# Patient Record
Sex: MALE | Race: BLACK OR AFRICAN AMERICAN | NOT HISPANIC OR LATINO | Employment: OTHER | URBAN - METROPOLITAN AREA
[De-identification: names, ages, dates, MRNs, and addresses within clinical notes are randomized per-mention and may not be internally consistent; named-entity substitution may affect disease eponyms.]

---

## 2017-10-09 ENCOUNTER — OFFICE VISIT (OUTPATIENT)
Dept: FAMILY MEDICINE | Facility: CLINIC | Age: 22
End: 2017-10-09
Payer: COMMERCIAL

## 2017-10-09 VITALS
HEIGHT: 69 IN | WEIGHT: 184.75 LBS | DIASTOLIC BLOOD PRESSURE: 69 MMHG | OXYGEN SATURATION: 97 % | SYSTOLIC BLOOD PRESSURE: 120 MMHG | HEART RATE: 65 BPM | TEMPERATURE: 99 F | BODY MASS INDEX: 27.36 KG/M2

## 2017-10-09 DIAGNOSIS — S83.421A SPRAIN OF LATERAL COLLATERAL LIGAMENT OF RIGHT KNEE, INITIAL ENCOUNTER: Primary | ICD-10-CM

## 2017-10-09 DIAGNOSIS — S39.012A STRAIN OF LUMBAR REGION, INITIAL ENCOUNTER: ICD-10-CM

## 2017-10-09 PROCEDURE — 99213 OFFICE O/P EST LOW 20 MIN: CPT | Mod: S$GLB,,, | Performed by: INTERNAL MEDICINE

## 2017-10-09 PROCEDURE — 99999 PR PBB SHADOW E&M-EST. PATIENT-LVL III: CPT | Mod: PBBFAC,,, | Performed by: INTERNAL MEDICINE

## 2017-10-09 NOTE — PROGRESS NOTES
"This note was created by combination of typed  and Dragon dictation.  Transcription errors may be present.  If there are any questions, please contact me.    Assessment & Plan  Sprain of lateral collateral ligament of right knee, initial encounter  Strain of lumbar region, initial encounter  -expect complete resolution, no indication for imaging today, rest, cool packs, home PT handout provided.    Medications Discontinued During This Encounter   Medication Reason    methocarbamol (ROBAXIN) 500 MG Tab Patient no longer taking       Follow-up: No Follow-up on file.      =================================================================      Chief Complaint   Patient presents with    Low-back Pain     week    Knee Pain     Right/1 1/2 weeks       HPI  Edwar is a 21 y.o. male, last appointment with this clinic was Visit date not found.    Right knee pain.  Similar to left knee pain, pain with marching and running.  Was told he had "runner's knee".  Similar to it know.  Feels like it's on the lateral right knee no swelling.  Is currently [employer].  No inciting event.  Using a knee brace.  Maintaining activity.  Nothing OTC for this.  Doing lunges.    Playing flag football weekly.  Going out and tripped up and landed on his back. Pain in the middle of the lower back.  About a week ago.  Pain with leaning forward or standing too long it hurts.  No pain with BM/urination.      Patient Care Team:  Ricarda Callahan MD as PCP - General (Pediatrics)    There are no active problems to display for this patient.      PAST MEDICAL HISTORY:  History reviewed. No pertinent past medical history.    PAST SURGICAL HISTORY:  History reviewed. No pertinent surgical history.    SOCIAL HISTORY:  Social History     Social History    Marital status: Single     Spouse name: N/A    Number of children: N/A    Years of education: N/A     Occupational History    Not on file.     Social History Main Topics    Smoking status: " "Never Smoker    Smokeless tobacco: Never Used    Alcohol use No    Drug use: No    Sexual activity: No     Other Topics Concern    Not on file     Social History Narrative    No narrative on file       ALLERGIES AND MEDICATIONS: updated and reviewed.  Review of patient's allergies indicates:  No Known Allergies  No current outpatient prescriptions on file.     No current facility-administered medications for this visit.        Review of Systems   Constitutional: Negative for chills and fever.   Gastrointestinal: Negative for abdominal pain.   Genitourinary: Negative for dysuria.       Physical Exam   Vitals:    10/09/17 1541   BP: 120/69   BP Location: Right arm   Patient Position: Sitting   BP Method: Large (Manual)   Pulse: 65   Temp: 98.5 °F (36.9 °C)   TempSrc: Oral   SpO2: 97%   Weight: 83.8 kg (184 lb 11.9 oz)   Height: 5' 8.5" (1.74 m)    Body mass index is 27.68 kg/m².  Weight: 83.8 kg (184 lb 11.9 oz)   Height: 5' 8.5" (174 cm)     Physical Exam   Constitutional: He appears well-developed and well-nourished. No distress.   HENT:   Head: Normocephalic and atraumatic.   Eyes: No scleral icterus.   Musculoskeletal:   L spine mild TTP lower lumbar spine midline without deformity, instability, asymmetry. Straight leg raise without pain bilaterally  R knee, no effusion, full ROM, some mild TTP lateral joint space, Nikko some discomfort on the lateral knee; normal flexion though some pain with most extreme flexion.   Skin: No rash noted.   Psychiatric: He has a normal mood and affect. His behavior is normal. Thought content normal.     "

## 2017-10-09 NOTE — LETTER
October 9, 2017      [facility]  [address] [facility]  [place] [address]  Phone: [phone]  Fax: [phone]       Patient: Edwar Hung   YOB: 1995  Date of Visit: 10/09/2017    To Whom It May Concern:    Messi Hung  was at [facility] on 10/09/2017. He is recovering from acute injury and I have recommended that he not participate in PT testing this weekend.  I anticipate that he will be able to perform in 2 weeks. If you have any questions or concerns, or if I can be of further assistance, please do not hesitate to contact me.    Sincerely,      Roberto Groves MD

## 2017-10-30 ENCOUNTER — HOSPITAL ENCOUNTER (OUTPATIENT)
Dept: RADIOLOGY | Facility: HOSPITAL | Age: 22
Discharge: HOME | End: 2017-10-30
Attending: FAMILY MEDICINE
Payer: COMMERCIAL

## 2017-10-30 ENCOUNTER — OFFICE VISIT (OUTPATIENT)
Dept: FAMILY MEDICINE | Facility: CLINIC | Age: 22
End: 2017-10-30
Payer: COMMERCIAL

## 2017-10-30 VITALS
HEIGHT: 68 IN | OXYGEN SATURATION: 97 % | TEMPERATURE: 99 F | SYSTOLIC BLOOD PRESSURE: 130 MMHG | HEART RATE: 68 BPM | WEIGHT: 189.5 LBS | BODY MASS INDEX: 28.72 KG/M2 | DIASTOLIC BLOOD PRESSURE: 82 MMHG

## 2017-10-30 DIAGNOSIS — M54.50 ACUTE MIDLINE LOW BACK PAIN WITHOUT SCIATICA: ICD-10-CM

## 2017-10-30 DIAGNOSIS — M54.50 ACUTE MIDLINE LOW BACK PAIN WITHOUT SCIATICA: Primary | ICD-10-CM

## 2017-10-30 PROCEDURE — 99214 OFFICE O/P EST MOD 30 MIN: CPT | Mod: S$GLB,,, | Performed by: FAMILY MEDICINE

## 2017-10-30 PROCEDURE — 72100 X-RAY EXAM L-S SPINE 2/3 VWS: CPT | Mod: 26,,, | Performed by: RADIOLOGY

## 2017-10-30 PROCEDURE — 99999 PR PBB SHADOW E&M-EST. PATIENT-LVL III: CPT | Mod: PBBFAC,,, | Performed by: FAMILY MEDICINE

## 2017-10-30 PROCEDURE — 72100 X-RAY EXAM L-S SPINE 2/3 VWS: CPT | Mod: TC,PO

## 2017-10-30 RX ORDER — NAPROXEN 500 MG/1
500 TABLET ORAL 2 TIMES DAILY WITH MEALS
Qty: 30 TABLET | Refills: 0 | Status: SHIPPED | OUTPATIENT
Start: 2017-10-30 | End: 2018-05-17

## 2017-10-30 RX ORDER — CYCLOBENZAPRINE HCL 10 MG
10 TABLET ORAL NIGHTLY
Qty: 21 TABLET | Refills: 0 | Status: SHIPPED | OUTPATIENT
Start: 2017-10-30 | End: 2018-05-17

## 2017-10-30 NOTE — LETTER
October 30, 2017      [facility]  [address] [facility]  [place] [address]  Phone: [phone]  Fax: [phone]       Patient: Edwar Hung   YOB: 1995  Date of Visit: 10/30/2017    To Whom It May Concern:    Messi Hung  was at [facility] on 10/30/2017. He may return to work/school on 10/31/2017 with no restrictions. If you have any questions or concerns, or if I can be of further assistance, please do not hesitate to contact me.    Sincerely,    Esthela Julian MA

## 2017-10-30 NOTE — PROGRESS NOTES
Office Visit    Patient Name: Edwar Hung Jr.    : 1995  MRN: [id number]      Assessment/Plan:  Edwar Hung Jr. is a 22 y.o. male who presents today for :    Acute midline low back pain without sciatica  -     X-Ray Lumbar Spine Ap And Lateral; Future; Expected date: 10/30/2017  -     naproxen (NAPROSYN) 500 MG tablet; Take 1 tablet (500 mg total) by mouth 2 (two) times daily with meals.  Dispense: 30 tablet; Refill: 0  -     cyclobenzaprine (FLEXERIL) 10 MG tablet; Take 1 tablet (10 mg total) by mouth every evening.  Dispense: 21 tablet; Refill: 0    -obtain XR to r/o fracture-reassurance provided - advised pt that pain may last up to 4-6 weeks, but in the meantime, advised to add heat/massage and avoid provocative movements that could worsen pain, maintain good posture, as well as using proper lifting techniques.  -initiate Naproxen and Flexeril, advised adding Tylenol in between doses of NSAIDs as needed for pain. Side effects and precautions given.  -counseled patient extensively on stretching/strengthening exercises, maintaining good posture as demonstrated in clinic (handout also given) to help with decreasing risk for future injury.  -RTC in 4-6 weeks, or sooner if Sx worsens or call clinic back if pt has any concerns.         Return in about 4 weeks (around 2017).     This note was created by combination of typed  and Dragon dictation.  Transcription errors may be present.  If there are any questions, please contact me.                  ----------------------------------------------------------------------------------------------------------------------      HPI:  Edwar is a 22 y.o. male who presents today for:    Back Pain        This patient has multiple medical diagnoses as noted below.    This patient is new to me     BACK PAIN    here for back pain for 4 weeks   Patient was playing flag football and went up for an interception and got tripped and landed flat on his  "back.   Pt was able to get up and continue to play with mild pain. He woke up the next day with pain 8/10 - he took some tylenol and used a back brace and the pain got better.   Since then, the pain has been about 5/10 and he continues to take Tylenol.  Worse with bending over, slouching, standing for long periods of time     Pt is still able to ambulate comfortably and perform daily routine    No wt loss/fatigue/malaise/BMs changes or urinary changes/pain radiation to LEs/tingling/numbness/weakness.          Additional ROS  No vision changes  No F/C/wt changes/fatigue  No dysphagia/sore throat/rhinorrhea  No CP/SOB/palpitations/swelling  No cough/wheezing/SOB  No nausea/vomiting/abd pain/blood in stool, no diarrhea, no constipation  No muscle aches/joint pain  No rashes  No weakness/HA/tingling/numbness      There is no problem list on file for this patient.      History reviewed. No pertinent surgical history.    History reviewed. No pertinent family history.    Social History     Social History    Marital status: Single     Spouse name: N/A    Number of children: N/A    Years of education: N/A     Occupational History    Not on file.     Social History Main Topics    Smoking status: Never Smoker    Smokeless tobacco: Never Used    Alcohol use No    Drug use: No    Sexual activity: No     Other Topics Concern    Not on file     Social History Narrative    No narrative on file       Current Medications  Medications reviewed and updated.     Allergies   Review of patient's allergies indicates:  No Known Allergies          Review of Systems  See HPI      Physical Exam  /82 (BP Location: Right arm, Patient Position: Sitting, BP Method: Large (Manual))   Pulse 68   Temp 98.6 °F (37 °C) (Oral)   Ht 5' 8" (1.727 m)   Wt 86 kg (189 lb 7.8 oz)   SpO2 97%   BMI 28.81 kg/m²     GEN: NAD, well developed, pleasant, well nourished  SKIN: normal turgor, no rashes  PSYCH: AOx3, appropriate mood and " affect  MSK: warm/well perfused, normal ROM in all 4 extremities, no c/c/e.  BACK: normal alignment of spine. FROM of back.   +TTP over the L4 area over spine and b/l paraspinal mm.   +pain with forward flexion and backward extension. No pain with lateral bending. NEG straight leg raise.

## 2018-05-17 ENCOUNTER — HOSPITAL ENCOUNTER (OUTPATIENT)
Dept: RADIOLOGY | Facility: HOSPITAL | Age: 23
Discharge: HOME | End: 2018-05-17
Attending: FAMILY MEDICINE
Payer: COMMERCIAL

## 2018-05-17 ENCOUNTER — OFFICE VISIT (OUTPATIENT)
Dept: FAMILY MEDICINE | Facility: CLINIC | Age: 23
End: 2018-05-17
Payer: COMMERCIAL

## 2018-05-17 VITALS
OXYGEN SATURATION: 98 % | HEART RATE: 89 BPM | BODY MASS INDEX: 27.17 KG/M2 | TEMPERATURE: 98 F | WEIGHT: 189.81 LBS | DIASTOLIC BLOOD PRESSURE: 76 MMHG | HEIGHT: 70 IN | SYSTOLIC BLOOD PRESSURE: 118 MMHG

## 2018-05-17 DIAGNOSIS — M79.645 PAIN OF LEFT THUMB: ICD-10-CM

## 2018-05-17 DIAGNOSIS — M79.644 PAIN OF RIGHT MIDDLE FINGER: ICD-10-CM

## 2018-05-17 DIAGNOSIS — M79.645 PAIN OF LEFT THUMB: Primary | ICD-10-CM

## 2018-05-17 PROCEDURE — 73130 X-RAY EXAM OF HAND: CPT | Mod: 26,LT,, | Performed by: STUDENT IN AN ORGANIZED HEALTH CARE EDUCATION/TRAINING PROGRAM

## 2018-05-17 PROCEDURE — 99214 OFFICE O/P EST MOD 30 MIN: CPT | Mod: S$GLB,,, | Performed by: FAMILY MEDICINE

## 2018-05-17 PROCEDURE — 99999 PR PBB SHADOW E&M-EST. PATIENT-LVL III: CPT | Mod: PBBFAC,,, | Performed by: FAMILY MEDICINE

## 2018-05-17 PROCEDURE — 3008F BODY MASS INDEX DOCD: CPT | Mod: CPTII,S$GLB,, | Performed by: FAMILY MEDICINE

## 2018-05-17 PROCEDURE — 73130 X-RAY EXAM OF HAND: CPT | Mod: 26,RT,, | Performed by: STUDENT IN AN ORGANIZED HEALTH CARE EDUCATION/TRAINING PROGRAM

## 2018-05-17 PROCEDURE — 73130 X-RAY EXAM OF HAND: CPT | Mod: 50,TC,FY,PO

## 2018-05-17 NOTE — PROGRESS NOTES
Office Visit    Patient Name: Edwar Hung Jr.    : 1995  MRN: [id number]      Assessment/Plan:  Edwar Hung Jr. is a 22 y.o. male who presents today for :    Pain of left thumb  -     X-Ray Hand Complete Left; Future; Expected date: 2018    Pain of right middle finger  -     X-Ray Hand Complete Right; Future; Expected date: 2018    -XR to r/o fracture due to persistent pain  -activity modification d/w pt  -advised icing and elevation, and daily stretching finger exercises to increased mobility and flexibility. Advised pt that it may take up to 4-6 weeks to see significant improvement  -Tylenol/Ibuprofen as needed for pain.    -f/u in 1 week        This note was created by combination of typed  and Dragon dictation.  Transcription errors may be present.  If there are any questions, please contact me.        ----------------------------------------------------------------------------------------------------------------------      HPI:  Edwar is a 22 y.o. male who presents today for:    Hand Pain      This patient is new to me     This patient has multiple medical diagnoses as noted below.      Patient is here today for  Hand Pain  Left thumb pain that started yesterday, associated with swelling, after he jammed his finger while playing flag football. Pt states that he is unable to bend his finger due to the pain and swelling. He also jammed his middle R finger 1 week ago in a similar manner, still has moderate pain with bending and is very stiff, unable to complete bend finger toward palm.  Pain is mostly tolerable at rest.   Pt is still able to perform daily routine. Has not taken any meds OTC.  No tingling/numbness/weakness.  Denies pain radiation        Additional ROS    No CP/SOB/palpitations/swelling  No cough/wheezing/SOB  No nausea/vomiting/abd pain  No rash, no history of allergies to any specific substances    There is no problem list on file for this  "patient.      Current Medications  Medications reviewed/updated.     Current Outpatient Prescriptions on File Prior to Visit   Medication Sig Dispense Refill    [DISCONTINUED] cyclobenzaprine (FLEXERIL) 10 MG tablet Take 1 tablet (10 mg total) by mouth every evening. 21 tablet 0    [DISCONTINUED] naproxen (NAPROSYN) 500 MG tablet Take 1 tablet (500 mg total) by mouth 2 (two) times daily with meals. 30 tablet 0     No current facility-administered medications on file prior to visit.        History reviewed. No pertinent surgical history.    History reviewed. No pertinent family history.    Social History     Social History    Marital status: Single     Spouse name: N/A    Number of children: N/A    Years of education: N/A     Occupational History    Not on file.     Social History Main Topics    Smoking status: Never Smoker    Smokeless tobacco: Never Used    Alcohol use No    Drug use: No    Sexual activity: No     Other Topics Concern    Not on file     Social History Narrative    No narrative on file           Allergies   Review of patient's allergies indicates:  No Known Allergies          Review of Systems  See HPI      Physical Exam  /76 (BP Location: Left arm, Patient Position: Sitting, BP Method: Medium (Manual))   Pulse 89   Temp 98.4 °F (36.9 °C) (Oral)   Ht 5' 10" (1.778 m)   Wt 86.1 kg (189 lb 13.1 oz)   SpO2 98%   BMI 27.24 kg/m²     GEN: NAD, well developed, pleasant, well nourished  LUNGS: CTAB, no w/r/r, no increased work of breathing   HEART: RRR, normal S1 and S2, no m/r/g, no edema  SKIN: normal turgor, no rashes  MSK: warm/well perfused, normal ROM in all 4 extremities, no c/c/e.  L thumb with moderate swelling, no redness, with decreased ROM due to pain. R middle finger with mild swelling, no redness, able to flex only retirement towards palm. Normal sensation otherwise.              "

## 2018-05-19 ENCOUNTER — PATIENT MESSAGE (OUTPATIENT)
Dept: FAMILY MEDICINE | Facility: CLINIC | Age: 23
End: 2018-05-19

## 2018-05-21 ENCOUNTER — TELEPHONE (OUTPATIENT)
Dept: FAMILY MEDICINE | Facility: CLINIC | Age: 23
End: 2018-05-21

## 2018-05-21 DIAGNOSIS — S61.209A AVULSION OF FINGER, INITIAL ENCOUNTER: Primary | ICD-10-CM

## 2018-05-21 NOTE — TELEPHONE ENCOUNTER
Please notify pt that I have already made a referral to Dr. Calin Rodriguez - hand specialist for his finger avulsion.    1) Please [facility] to set up an appt for patient and let him know the time he's available.    2) Ask patient to RTC for annual physical.    Bilateral Hand XR 5/17/18  FINDINGS:  Osseous structures are well mineralized.    On the PA view of the right hand, there are 2 small ossific fragments on the ulnar side of the PIP joint of the 3rd finger possibly representing avulsion fragments at the insertion of the lateral collateral ligament.  There is a marker indicating pain of the 3rd digit.  There is subtle swelling about the PIP joint of the 3rd digit.    No additional fracture is suspected.  No dislocation or aggressive bony lesion.      Impression       Suspected avulsion at the insertion of the lateral collateral ligament on the head of the proximal phalange of the 3rd digit.  This correlates with the marked area of pain and some mild soft tissue swelling about the PIP joint of the 3rd digit.    No definite osseous injury at the site of marked pain of the thumb of the left hand.    This report was flagged in Epic as abnormal.

## 2018-06-12 ENCOUNTER — OFFICE VISIT (OUTPATIENT)
Dept: FAMILY MEDICINE | Facility: CLINIC | Age: 23
End: 2018-06-12
Payer: COMMERCIAL

## 2018-06-12 VITALS
HEART RATE: 67 BPM | HEIGHT: 70 IN | WEIGHT: 193.44 LBS | OXYGEN SATURATION: 96 % | SYSTOLIC BLOOD PRESSURE: 116 MMHG | TEMPERATURE: 98 F | DIASTOLIC BLOOD PRESSURE: 72 MMHG | BODY MASS INDEX: 27.69 KG/M2

## 2018-06-12 DIAGNOSIS — E66.3 OVERWEIGHT (BMI 25.0-29.9): ICD-10-CM

## 2018-06-12 DIAGNOSIS — Z00.00 ANNUAL PHYSICAL EXAM: Primary | ICD-10-CM

## 2018-06-12 DIAGNOSIS — T14.8XXA AVULSION FRACTURE: ICD-10-CM

## 2018-06-12 PROCEDURE — 99395 PREV VISIT EST AGE 18-39: CPT | Mod: S$GLB,,, | Performed by: FAMILY MEDICINE

## 2018-06-12 PROCEDURE — 99999 PR PBB SHADOW E&M-EST. PATIENT-LVL III: CPT | Mod: PBBFAC,,, | Performed by: FAMILY MEDICINE

## 2018-06-12 NOTE — PROGRESS NOTES
Office Visit    Patient Name: Edwar Hung Jr.    : 1995  MRN: [id number]      Assessment/Plan:  Edwar Hung Jr. is a 22 y.o. male who presents today for :    Annual physical exam  -     Hemoglobin A1c; Future; Expected date: 2018  -     CBC Without Differential; Future; Expected date: 2018  -     Comprehensive metabolic panel; Future; Expected date: 2018  -     Lipid panel; Future; Expected date: 2018  Overweight (BMI 25.0-29.9)    -anticipatory guidance provided with age appropriate preventative services discussed  -advised regular physical exercises/healthy nutrition and food choices.   -d/w pt about the importance of portion control  -advised moderate consumption of alcohol and benefits of avoiding tobacco use/substance abuse.    Avulsion fracture    -continue with PT  -f/u with Ortho as needed      Follow-up for any evaluation as needed.     This note was created by combination of typed  and Dragon dictation.  Transcription errors may be present.  If there are any questions, please contact me.        ----------------------------------------------------------------------------------------------------------------------      HPI:  Edwar is a 22 y.o. male who presents today for:          This patient has multiple medical diagnoses as noted below.    Patient is here today for Annual Exam  .    Patient is doing well and has no major complaints.  He was last seen for avulsion fracture in the right 3rd digit, saw Ortho 2-3 weeks ago, currently undergoing PT with increased mobility. Pain is mild and tolerable.  Drug use? No, Tobacco use? no, Alcohol use? social  Pt is trying to eat a healthy diet, been cutting down fast foods. He is active and plays football with his friends during the week.         Additional ROS  No F/C/wt changes/fatigue  No dysphagia/sore throat/rhinorrhea  No CP/SOB/palpitations/swelling  No cough/wheezing/SOB  No nausea/vomiting/abd pain/no  "diarrhea, no constipation, no blood in stool  No muscle aches, R middle finger pain due to recent fracture    No rashes  No weakness/HA/tingling/numbness  No anxiety/depression  No dysuria/hematuria  No polyuria/polydipsia/fatigue/cold or hot intolerance          Patient Care Team:  Mathieu Alvarez MD as PCP - General (Family Medicine)      Patient Active Problem List   Diagnosis    Overweight (BMI 25.0-29.9)       Current Medications  Medications reviewed and updated.     No current outpatient prescriptions on file.    History reviewed. No pertinent surgical history.    History reviewed. No pertinent family history.    Social History     Social History    Marital status: Single     Spouse name: N/A    Number of children: N/A    Years of education: N/A     Occupational History    Not on file.     Social History Main Topics    Smoking status: Never Smoker    Smokeless tobacco: Never Used    Alcohol use No    Drug use: No    Sexual activity: No     Other Topics Concern    Not on file     Social History Narrative    No narrative on file           Allergies   Review of patient's allergies indicates:  No Known Allergies          Review of Systems  See HPI      Physical Exam  /72   Pulse 67   Temp 98.4 °F (36.9 °C) (Oral)   Ht 5' 10" (1.778 m)   Wt 87.8 kg (193 lb 7.3 oz)   SpO2 96%   BMI 27.76 kg/m²     GEN: NAD, well developed, pleasant, well nourished  HEENT: NCAT, PERRLA, EOMI, sclera clear, anicteric, bilateral ear exam wnl, O/P clear, MMM with no lesions  NECK: normal, supple with midline trachea, no LAD, no thyromegaly  LUNGS: CTAB, no w/r/r, no increased work of breathing   HEART: RRR, normal S1 and S2, no m/r/g, no edema  ABD: s/nt/nd, NABS  SKIN: normal turgor, no rashes  PSYCH: AOx3, appropriate mood and affect  MSK: warm/well perfused, normal ROM in all 4 extremities, no c/c/e. Mild R middle finger pain, no swelling/redness      Labs  No results found for: LABA1C, HGBA1C  No results " found for: NA, K, CL, CO2, BUN, CREATININE, CALCIUM, ANIONGAP, ESTGFRAFRICA, EGFRNONAA  No results found for: CHOL  No results found for: HDL  No results found for: LDLCALC  No results found for: TRIG  No results found for: CHOLHDL  Last set of blood work has been reviewed as noted above.

## 2019-06-07 ENCOUNTER — OFFICE VISIT (OUTPATIENT)
Dept: URGENT CARE | Facility: CLINIC | Age: 24
End: 2019-06-07
Payer: COMMERCIAL

## 2019-06-07 VITALS
TEMPERATURE: 98 F | HEIGHT: 70 IN | OXYGEN SATURATION: 98 % | HEART RATE: 62 BPM | RESPIRATION RATE: 18 BRPM | SYSTOLIC BLOOD PRESSURE: 132 MMHG | WEIGHT: 190 LBS | DIASTOLIC BLOOD PRESSURE: 79 MMHG | BODY MASS INDEX: 27.2 KG/M2

## 2019-06-07 DIAGNOSIS — S43.401A SPRAIN OF RIGHT SHOULDER, UNSPECIFIED SHOULDER SPRAIN TYPE, INITIAL ENCOUNTER: Primary | ICD-10-CM

## 2019-06-07 PROCEDURE — 99214 OFFICE O/P EST MOD 30 MIN: CPT | Mod: S$GLB,,, | Performed by: FAMILY MEDICINE

## 2019-06-07 PROCEDURE — 99214 PR OFFICE/OUTPT VISIT, EST, LEVL IV, 30-39 MIN: ICD-10-PCS | Mod: S$GLB,,, | Performed by: FAMILY MEDICINE

## 2019-06-07 PROCEDURE — 3008F BODY MASS INDEX DOCD: CPT | Mod: CPTII,S$GLB,, | Performed by: FAMILY MEDICINE

## 2019-06-07 PROCEDURE — 3008F PR BODY MASS INDEX (BMI) DOCUMENTED: ICD-10-PCS | Mod: CPTII,S$GLB,, | Performed by: FAMILY MEDICINE

## 2019-06-07 RX ORDER — IBUPROFEN 800 MG/1
800 TABLET ORAL EVERY 8 HOURS PRN
Qty: 30 TABLET | Refills: 1 | Status: SHIPPED | OUTPATIENT
Start: 2019-06-07 | End: 2020-04-01

## 2019-06-07 NOTE — PROGRESS NOTES
"Subjective:       Patient ID: Edwar Hung Jr. is a 23 y.o. male.    Vitals:  height is 5' 10" (1.778 m) and weight is 86.2 kg (190 lb). His temperature is 98 °F (36.7 °C). His blood pressure is 132/79 and his pulse is 62. His respiration is 18 and oxygen saturation is 98%.     Chief Complaint: Shoulder Pain    Patient reports onset of symptoms after playing baseball. Pain with abduction per patient.     Shoulder Pain    The pain is present in the right shoulder and neck. This is a new problem. The current episode started yesterday. The problem occurs constantly. The problem has been rapidly worsening. The quality of the pain is described as aching. The pain is at a severity of 1/10. The pain is mild. Associated symptoms include an inability to bear weight and a limited range of motion. Pertinent negatives include no fever or headaches. The symptoms are aggravated by activity and lying down. He has tried nothing for the symptoms. The treatment provided no relief.       Constitution: Negative for chills, fatigue and fever.   HENT: Negative for congestion and sore throat.    Neck: Negative for painful lymph nodes.   Cardiovascular: Negative for chest pain and leg swelling.   Eyes: Negative for double vision and blurred vision.   Respiratory: Negative for cough and shortness of breath.    Gastrointestinal: Negative for nausea, vomiting and diarrhea.   Genitourinary: Negative for dysuria, frequency and urgency.   Musculoskeletal: Positive for pain, joint pain, abnormal ROM of joint and muscle ache. Negative for joint swelling and muscle cramps.   Skin: Negative for color change, pale and rash.   Allergic/Immunologic: Negative for seasonal allergies.   Neurological: Negative for dizziness, history of vertigo, light-headedness, passing out and headaches.   Hematologic/Lymphatic: Negative for swollen lymph nodes, easy bruising/bleeding and history of blood clots. Does not bruise/bleed easily. "   Psychiatric/Behavioral: Negative for nervous/anxious, sleep disturbance and depression. The patient is not nervous/anxious.        Objective:      Physical Exam   Constitutional: He appears well-developed and well-nourished.   HENT:   Head: Normocephalic and atraumatic.   Eyes: Pupils are equal, round, and reactive to light. EOM are normal.   Neck: Normal range of motion. Neck supple.   Cardiovascular: Normal rate, regular rhythm and normal heart sounds.   Musculoskeletal: He exhibits tenderness (right supraspinatus region).   Nursing note and vitals reviewed.      Assessment:       1. Sprain of right shoulder, unspecified shoulder sprain type, initial encounter        Plan:         Sprain of right shoulder, unspecified shoulder sprain type, initial encounter  -     ibuprofen (ADVIL,MOTRIN) 800 MG tablet; Take 1 tablet (800 mg total) by mouth every 8 (eight) hours as needed for Pain (with food).  Dispense: 30 tablet; Refill: 1  -     SLING FOR HOME USE    reduced activity for one week.

## 2019-06-07 NOTE — LETTER
June 7, 2019      [facility] [address], [address]  [place] [address]  Phone: [phone]  Fax: [phone]       Patient: Edwar Hung   YOB: 1995  Date of Visit: 06/07/2019    To Whom It May Concern:    Messi Hung  was at [facility] on 06/07/2019. He may return to work/school on 06/07/2019 with restrictions restricted upper body activities for one week. If you have any questions or concerns, or if I can be of further assistance, please do not hesitate to contact me.    Sincerely,        Arpan Elmore MD

## 2019-06-07 NOTE — PATIENT INSTRUCTIONS
Shoulder Sprain  A sprain is a stretching or tearing of the ligaments that hold a joint together. A sprain may take up to 8 weeks to fully heal, depending on how severe it is. Moderate to severe shoulder sprains are treated with a sling or shoulder immobilizer. Minor sprains can be treated without any special support.  Home care  The following guidelines will help you care for your injury at home:  · If a sling was given to you, leave it in place for the time advised by your healthcare provider. If you arent sure how long to wear it, ask for advice. If the sling becomes loose, adjust it so that your forearm is level with the ground. Your shoulder should feel well supported.  · Put an ice pack on the injured area for 20 minutes every 1 to 2 hours the first day. You can make your own ice pack by putting ice cubes in a plastic bag. A bag of frozen peas or something similar works well too. Wrap the bag in a thin towel. Continue with ice packs 3 to 4 times a day for the next 2 to 3 days. Then use the pack as needed to ease pain and swelling.  · You may use acetaminophen or ibuprofen to control pain, unless another pain medicine was prescribed. If you have chronic liver or kidney disease, talk with your healthcare provider before using these medicines. Also talk with your provider if youve had a stomach ulcer or gastrointestinal bleeding.  · Shoulder joints become stiff if left in a sling for too long. You should start range of motion exercises about 7 to 10 days after the injury. Talk with your provider to find out what type of exercises to do and how soon to start.  Follow-up care  Follow up with your healthcare provider, or as advised.  Any X-rays you had today dont show any broken bones, breaks, or fractures. Sometimes fractures dont show up on the first X-ray. Bruises and sprains can sometimes hurt as much as a fracture. These injuries can take time to heal completely. If your symptoms dont improve or they get  worse, talk with your provider. You may need a repeat X-ray or other treatments.  When to seek medical advice  Call your healthcare provider right away if any of these occur:  · Shoulder pain or swelling in your arm that gets worse  · Fingers become cold, blue, numb, or tingly  · Large amount of bruising of the shoulder or upper arm  · Fever or chills  Date Last Reviewed: 8/1/2016  © 1829-2470 [employer]. [address] [place] [address], [place] [address]. All rights reserved. This information is not intended as a substitute for professional medical care. Always follow your healthcare professional's instructions.

## 2019-07-08 ENCOUNTER — OFFICE VISIT (OUTPATIENT)
Dept: URGENT CARE | Facility: CLINIC | Age: 24
End: 2019-07-08
Payer: COMMERCIAL

## 2019-07-08 VITALS
WEIGHT: 190 LBS | HEART RATE: 64 BPM | HEIGHT: 70 IN | OXYGEN SATURATION: 99 % | DIASTOLIC BLOOD PRESSURE: 70 MMHG | BODY MASS INDEX: 27.2 KG/M2 | TEMPERATURE: 98 F | SYSTOLIC BLOOD PRESSURE: 130 MMHG

## 2019-07-08 DIAGNOSIS — M79.601 PAIN OF RIGHT UPPER EXTREMITY: Primary | ICD-10-CM

## 2019-07-08 PROCEDURE — 99214 PR OFFICE/OUTPT VISIT, EST, LEVL IV, 30-39 MIN: ICD-10-PCS | Mod: S$GLB,,, | Performed by: NURSE PRACTITIONER

## 2019-07-08 PROCEDURE — 3008F BODY MASS INDEX DOCD: CPT | Mod: CPTII,S$GLB,, | Performed by: NURSE PRACTITIONER

## 2019-07-08 PROCEDURE — 99214 OFFICE O/P EST MOD 30 MIN: CPT | Mod: S$GLB,,, | Performed by: NURSE PRACTITIONER

## 2019-07-08 PROCEDURE — 3008F PR BODY MASS INDEX (BMI) DOCUMENTED: ICD-10-PCS | Mod: CPTII,S$GLB,, | Performed by: NURSE PRACTITIONER

## 2019-07-08 RX ORDER — METHOCARBAMOL 500 MG/1
500 TABLET, FILM COATED ORAL 4 TIMES DAILY
Qty: 28 TABLET | Refills: 0 | Status: SHIPPED | OUTPATIENT
Start: 2019-07-08 | End: 2019-07-15

## 2019-07-08 RX ORDER — ETODOLAC 300 MG/1
300 CAPSULE ORAL 3 TIMES DAILY
Qty: 21 CAPSULE | Refills: 0 | Status: SHIPPED | OUTPATIENT
Start: 2019-07-08 | End: 2019-07-15

## 2019-07-08 NOTE — LETTER
July 8, 2019      [facility] [address], [address]  [place] [address]  Phone: [phone]  Fax: [phone]       Patient: Edwar Hung   YOB: 1995  Date of Visit: 07/08/2019    To Whom It May Concern:    Messi Hung  was at [facility] on 07/08/2019. He may return to work/school on 09 July 19 with no restrictions. If you have any questions or concerns, or if I can be of further assistance, please do not hesitate to contact me.    Sincerely,    Stewart Elmore, RT

## 2019-07-08 NOTE — PATIENT INSTRUCTIONS
If you were prescribed a narcotic or controlled medication, do not drive or operate heavy equipment or machinery while taking these medications.  You must understand that you've received an Urgent Care treatment only and that you may be released before all your medical problems are known or treated. You, the patient, will arrange for follow up care as instructed.  Follow up with your PCP or specialty clinic as directed within 2-5 days if not improved or as needed.  You can call [phone] to schedule an appointment with the appropriate provider.  If your condition worsens we recommend that you receive another evaluation at the emergency room immediately or contact your primary medical clinics after hours call service to discuss your concerns.  Please return here or go to the Emergency Department for any concerns or worsening of condition.      Muscle Strain in the Extremities  A muscle strain is a stretching and tearing of muscle fibers. This causes pain, especially when you move that muscle. There may also be some swelling and bruising.  Home care  · Keep the hurt area raised to reduce pain and swelling. This is especially important during the first 48 hours.  · Apply an ice pack over the injured area for 15 to 20 minutes every 3 to 6 hours. You should do this for the first 24 to 48 hours. You can make an ice pack by filling a plastic bag that seals at the top with ice cubes and then wrapping it with a thin towel. Be careful not to injure your skin with the ice treatments. Ice should never be applied directly to skin. Continue the use of ice packs for relief of pain and swelling as needed. After 48 hours, apply heat (warm shower or warm bath) for 15 to 20 minutes several times a day, or alternate ice and heat.  · You may use over-the-counter pain medicine to control pain, unless another medicine was prescribed. If you have chronic liver or kidney disease or ever had a stomach ulcer or GI bleeding, talk with  your healthcare provider before using these medicines.  · For leg strains: If crutches have been recommended, dont put full weight on the hurt leg until you can do so without pain. You can return to sports when you are able to hop and run on the injured leg without pain.  Follow-up care  Follow up with your healthcare provider, or as advised.  When to seek medical advice  Call your healthcare provider right away if any of these occur:  · The toes of the injured leg become swollen, cold, blue, numb, or tingly  · Pain or swelling increases  Date Last Reviewed: 11/19/2015  © 4055-1264 [employer]. [address] [place] [address], [place] [address]. All rights reserved. This information is not intended as a substitute for professional medical care. Always follow your healthcare professional's instructions.

## 2019-07-08 NOTE — PROGRESS NOTES
"Subjective:       Patient ID: Edwar Hung Jr. is a 23 y.o. male.    Vitals:  height is 5' 10" (1.778 m) and weight is 86.2 kg (190 lb). His temperature is 98.1 °F (36.7 °C). His blood pressure is 130/70 and his pulse is 64. His oxygen saturation is 99%.     Chief Complaint: Arm Pain (right arm)    Pt reports he was working out last week at the gym when he felt a pull and pain that radiates to his right shoulder     Arm Pain    The incident occurred 5 to 7 days ago. The incident occurred at the gym. The injury mechanism was repetitive motion. The pain is present in the right shoulder (right bicep). The quality of the pain is described as burning and aching. Radiates to: right shoulder. The pain is at a severity of 7/10. The pain is moderate. The pain has been worsening since the incident. Pertinent negatives include no chest pain. The symptoms are aggravated by movement and lifting. Treatments tried: advil.       Constitution: Negative for chills, fatigue and fever.   HENT: Negative for congestion and sore throat.    Neck: Negative for painful lymph nodes.   Cardiovascular: Negative for chest pain and leg swelling.   Eyes: Negative for double vision and blurred vision.   Respiratory: Negative for cough and shortness of breath.    Gastrointestinal: Negative for nausea, vomiting and diarrhea.   Genitourinary: Negative for dysuria, frequency and urgency.   Musculoskeletal: Positive for pain, joint pain and joint swelling. Negative for muscle cramps and muscle ache.   Skin: Negative for color change, pale and rash.   Allergic/Immunologic: Negative for seasonal allergies.   Neurological: Negative for dizziness, history of vertigo, light-headedness, passing out and headaches.   Hematologic/Lymphatic: Negative for swollen lymph nodes, easy bruising/bleeding and history of blood clots. Does not bruise/bleed easily.   Psychiatric/Behavioral: Negative for nervous/anxious, sleep disturbance and depression. The patient is " not nervous/anxious.        Objective:      Physical Exam   Constitutional: He is oriented to person, place, and time. Vital signs are normal. He appears well-developed and well-nourished. He is active and cooperative.  Non-toxic appearance. He does not have a sickly appearance. He does not appear ill. No distress.   HENT:   Head: Normocephalic and atraumatic.   Nose: Nose normal.   Mouth/Throat: Uvula is midline, oropharynx is clear and moist and mucous membranes are normal.   Eyes: Pupils are equal, round, and reactive to light. Conjunctivae, EOM and lids are normal.   Neck: Trachea normal, normal range of motion, full passive range of motion without pain and phonation normal. Neck supple.   Cardiovascular: Normal rate, regular rhythm, normal heart sounds, intact distal pulses and normal pulses.   Pulses:       Radial pulses are 2+ on the right side, and 2+ on the left side.   Pulmonary/Chest: Effort normal and breath sounds normal.   Musculoskeletal: He exhibits no edema or deformity.   Tenderness to palpation of the right biceps muscle.  No pain or limitation with active or passive abduction adduction, internal and external rotation, flexion and extension of right arm.  Negative empty can test.  Normal sensation over the deltoid.  Distal motor and neurovascular status is intact. No erythema, warmth or swelling noted.     Neurological: He is alert and oriented to person, place, and time. He has normal strength and normal reflexes. No sensory deficit.   Skin: Skin is warm, dry and intact. Capillary refill takes less than 2 seconds. No rash noted. He is not diaphoretic.   Psychiatric: He has a normal mood and affect. His speech is normal and behavior is normal. Judgment and thought content normal. Cognition and memory are normal.   Nursing note and vitals reviewed.      Assessment:       1. Pain of right upper extremity        Plan:       Presentation most consistent with musculoskeletal pain.    Pain of right  upper extremity  -     methocarbamol (ROBAXIN) 500 MG Tab; Take 1 tablet (500 mg total) by mouth 4 (four) times daily. for 7 days  Dispense: 28 tablet; Refill: 0  -     etodolac (LODINE) 300 MG Cap; Take 1 capsule (300 mg total) by mouth 3 (three) times daily. for 7 days  Dispense: 21 capsule; Refill: 0      Patient Instructions   If you were prescribed a narcotic or controlled medication, do not drive or operate heavy equipment or machinery while taking these medications.  You must understand that you've received an Urgent Care treatment only and that you may be released before all your medical problems are known or treated. You, the patient, will arrange for follow up care as instructed.  Follow up with your PCP or specialty clinic as directed within 2-5 days if not improved or as needed.  You can call [phone] to schedule an appointment with the appropriate provider.  If your condition worsens we recommend that you receive another evaluation at the emergency room immediately or contact your primary medical clinics after hours call service to discuss your concerns.  Please return here or go to the Emergency Department for any concerns or worsening of condition.      Muscle Strain in the Extremities  A muscle strain is a stretching and tearing of muscle fibers. This causes pain, especially when you move that muscle. There may also be some swelling and bruising.  Home care  · Keep the hurt area raised to reduce pain and swelling. This is especially important during the first 48 hours.  · Apply an ice pack over the injured area for 15 to 20 minutes every 3 to 6 hours. You should do this for the first 24 to 48 hours. You can make an ice pack by filling a plastic bag that seals at the top with ice cubes and then wrapping it with a thin towel. Be careful not to injure your skin with the ice treatments. Ice should never be applied directly to skin. Continue the use of ice packs for relief of pain and swelling as  needed. After 48 hours, apply heat (warm shower or warm bath) for 15 to 20 minutes several times a day, or alternate ice and heat.  · You may use over-the-counter pain medicine to control pain, unless another medicine was prescribed. If you have chronic liver or kidney disease or ever had a stomach ulcer or GI bleeding, talk with your healthcare provider before using these medicines.  · For leg strains: If crutches have been recommended, dont put full weight on the hurt leg until you can do so without pain. You can return to sports when you are able to hop and run on the injured leg without pain.  Follow-up care  Follow up with your healthcare provider, or as advised.  When to seek medical advice  Call your healthcare provider right away if any of these occur:  · The toes of the injured leg become swollen, cold, blue, numb, or tingly  · Pain or swelling increases  Date Last Reviewed: 11/19/2015  © 5083-1533 [employer]. [address] [place] [address], [place] [address]. All rights reserved. This information is not intended as a substitute for professional medical care. Always follow your healthcare professional's instructions.

## 2019-11-05 ENCOUNTER — OFFICE VISIT (OUTPATIENT)
Dept: URGENT CARE | Facility: CLINIC | Age: 24
End: 2019-11-05
Payer: COMMERCIAL

## 2019-11-05 VITALS
DIASTOLIC BLOOD PRESSURE: 82 MMHG | OXYGEN SATURATION: 99 % | TEMPERATURE: 99 F | SYSTOLIC BLOOD PRESSURE: 132 MMHG | HEIGHT: 70 IN | RESPIRATION RATE: 18 BRPM | HEART RATE: 76 BPM | BODY MASS INDEX: 27.2 KG/M2 | WEIGHT: 190 LBS

## 2019-11-05 DIAGNOSIS — R30.0 DYSURIA: ICD-10-CM

## 2019-11-05 DIAGNOSIS — Z20.2 EXPOSURE TO CHLAMYDIA: Primary | ICD-10-CM

## 2019-11-05 LAB
BILIRUB UR QL STRIP: NEGATIVE
GLUCOSE UR QL STRIP: NEGATIVE
KETONES UR QL STRIP: NEGATIVE
LEUKOCYTE ESTERASE UR QL STRIP: POSITIVE
PH, POC UA: 6.5 (ref 5–8)
POC BLOOD, URINE: NEGATIVE
POC NITRATES, URINE: NEGATIVE
PROT UR QL STRIP: NEGATIVE
SP GR UR STRIP: 1.02 (ref 1–1.03)
UROBILINOGEN UR STRIP-ACNC: POSITIVE (ref 0.3–2.2)

## 2019-11-05 PROCEDURE — 99214 PR OFFICE/OUTPT VISIT, EST, LEVL IV, 30-39 MIN: ICD-10-PCS | Mod: 25,S$GLB,, | Performed by: PHYSICIAN ASSISTANT

## 2019-11-05 PROCEDURE — 99214 OFFICE O/P EST MOD 30 MIN: CPT | Mod: 25,S$GLB,, | Performed by: PHYSICIAN ASSISTANT

## 2019-11-05 PROCEDURE — 81003 POCT URINALYSIS, DIPSTICK, AUTOMATED, W/O SCOPE: ICD-10-PCS | Mod: QW,S$GLB,, | Performed by: PHYSICIAN ASSISTANT

## 2019-11-05 PROCEDURE — 3008F BODY MASS INDEX DOCD: CPT | Mod: CPTII,S$GLB,, | Performed by: PHYSICIAN ASSISTANT

## 2019-11-05 PROCEDURE — 81003 URINALYSIS AUTO W/O SCOPE: CPT | Mod: QW,S$GLB,, | Performed by: PHYSICIAN ASSISTANT

## 2019-11-05 PROCEDURE — 3008F PR BODY MASS INDEX (BMI) DOCUMENTED: ICD-10-PCS | Mod: CPTII,S$GLB,, | Performed by: PHYSICIAN ASSISTANT

## 2019-11-05 RX ORDER — AZITHROMYCIN 500 MG/1
1000 TABLET, FILM COATED ORAL ONCE
Qty: 2 TABLET | Refills: 0 | Status: SHIPPED | OUTPATIENT
Start: 2019-11-05 | End: 2019-11-05

## 2019-11-06 NOTE — PROGRESS NOTES
"Subjective:       Patient ID: Edwar Hung Jr. is a 24 y.o. male.    Vitals:  height is 5' 10" (1.778 m) and weight is 86.2 kg (190 lb). His temperature is 98.7 °F (37.1 °C). His blood pressure is 132/82 and his pulse is 76. His respiration is 18 and oxygen saturation is 99%.     Chief Complaint: Exposure to STD    Pt states yesterday he was told by his partner she has chlamydia. He has been experencing some symptoms himself with tingling and burning when urinating. Denies discharge. He has not had an STD before.     Exposure to STD   The patient's primary symptoms include penile pain. The patient's pertinent negatives include no penile discharge, scrotal swelling or testicular pain. This is a new problem. The current episode started yesterday. The problem occurs constantly. The problem has been unchanged. The pain is mild. Associated symptoms include dysuria. Pertinent negatives include no abdominal pain, chills, fever, frequency, nausea, rash, urgency or vomiting. He has tried nothing for the symptoms. He is sexually active. He consistently uses condoms. Yes, his partner has an STD.       Constitution: Negative for chills and fever.   Neck: Negative for painful lymph nodes.   Gastrointestinal: Negative for abdominal pain, nausea and vomiting.   Genitourinary: Positive for dysuria and penile pain. Negative for frequency, urgency, urine decreased, hematuria, history of kidney stones, genital trauma, painful intercourse, genital sore, penile discharge, painful ejaculation, penile swelling, scrotal swelling and testicular pain.   Musculoskeletal: Negative for back pain.   Skin: Negative for rash and lesion.   Hematologic/Lymphatic: Negative for swollen lymph nodes.       Objective:      Physical Exam   Constitutional: He is oriented to person, place, and time. He appears well-developed and well-nourished. No distress.   Patient is sitting pleasantly on exam table in no acute distress. Nontoxic appearing.  "   HENT:   Head: Normocephalic and atraumatic.   Right Ear: External ear normal.   Left Ear: External ear normal.   Nose: Nose normal.   Mouth/Throat: Mucous membranes are normal.   Eyes: Conjunctivae and lids are normal.   Neck: Trachea normal and full passive range of motion without pain. Neck supple.   Cardiovascular: Normal rate, regular rhythm and normal heart sounds.   Pulmonary/Chest: Effort normal and breath sounds normal. No respiratory distress. He has no wheezes.   Abdominal: Soft. Normal appearance and bowel sounds are normal. He exhibits no distension, no abdominal bruit, no pulsatile midline mass and no mass. There is no tenderness. There is no rigidity, no rebound, no guarding and no CVA tenderness.   Genitourinary:   Genitourinary Comments: Deferred as he is having no discharge at this time.   Musculoskeletal: Normal range of motion. He exhibits no edema.   Neurological: He is alert and oriented to person, place, and time. He has normal strength.   Skin: Skin is warm, dry, intact, not diaphoretic and not pale.   Psychiatric: He has a normal mood and affect. His speech is normal and behavior is normal. Judgment and thought content normal. Cognition and memory are normal.   Nursing note and vitals reviewed.        Results for orders placed or performed in visit on 11/05/19   POCT Urinalysis, Dipstick, Automated, W/O Scope   Result Value Ref Range    POC Blood, Urine Negative Negative    POC Bilirubin, Urine Negative Negative    POC Urobilinogen, Urine Positive 0.3 - 2.2    POC Ketones, Urine Negative Negative    POC Protein, Urine Negative Negative    POC Nitrates, Urine Negative Negative    POC Glucose, Urine Negative Negative    pH, UA 6.5 5 - 8    POC Specific Gravity, Urine 1.020 1.003 - 1.029    POC Leukocytes, Urine Positive (A) Negative     Advised on return/follow-up precautions. Advised on ER precautions. Answered all patient questions. Patient verbalized understanding and voiced agreement with  current treatment plan.    Assessment:       1. Exposure to chlamydia    2. Dysuria        Plan:         Exposure to chlamydia  -     POCT Urinalysis, Dipstick, Automated, W/O Scope  -     azithromycin (ZITHROMAX) 500 MG tablet; Take 2 tablets (1,000 mg total) by mouth once. for 1 dose  Dispense: 2 tablet; Refill: 0  -     C. trachomatis/N. gonorrhoeae by AMP DNA    Dysuria  -     Urine culture      Patient Instructions       STD Screening  You were tested for STDs today, we will call you in 5-7 days with the results of the testing  You were treated for chlamydia today  Please take all antibiotics as directed to completion  If you need more medication when the labs result come in, we will call you and phone them in for you.    Increase condom use to prevent occurance.      IF POSITIVE:  Notify sexual partners of the need for testing.    Complete ALL medication prescribed.    NO sexual intercourse for 7 days after treatment.   Retest to ensure infection has cleared-there are infections that require more agressive treatment.  Retest for all in 6 weeks and again in 6 months to ensure true negative results.      Today's testing will give no crediable information if you have unprotected sexual activities going forward.         Chlamydia  Chlamydia is a very common sexually transmitted disease (STD). Most people do not have symptoms. Because of this, chlamydia may not be noticed until it causes severe problems. Left untreated, this infection can cause women and men to become sterile. This means they will not be able to have children.    Symptoms  Many people with chlamydia have no symptoms. Women are more likely than men not to have symptoms.  If symptoms show up in women, they include:  · Abnormal vaginal discharge  · Bleeding between periods  · Pain or burning during urination  If symptoms show up in men, they include:  · Clear discharge (drip) from the penis or anus  · Pain or burning during urination  These symptoms  usually disappear after a few weeks, whether or not you are treated. However, if you are not treated, the chlamydia will still be present and can cause long-term problems.  Potential problems  If the infection is not treated, it can lead to more serious health problems. In women, this can be pelvic inflammatory disease (PID). PID can make a woman sterile. It can also cause an ectopic (tubal) pregnancy. This type of pregnancy cannot be carried to term. Symptoms of PID include fever, pain during sex, and pain in the belly.  Sexually active women should get checked for chlamydia regularly. This can help prevent PID.   Treatment  When found early, chlamydia can be treated. It can be cured with antibiotic medicines. If you have it, tell your partner right away. Because women often dont have symptoms, men should ask their partners to get tested.  Prevention  Know your partners history. Protect yourself by using a latex condom whenever you have sex. If you are pregnant, take extra care to get proper treatment. Untreated chlamydia in a pregnant woman can pass the infection on to the baby, causing possible eye, ear, or lung problems. There is also the possibility of a premature delivery.  Resources  American Social Health Association STD Hotline  681.574.2069  www.ashastd.org  Centers for Disease Control and Prevention  423.524.9909  www.cdc.gov/std   Date Last Reviewed: 12/1/2016 © 2000-2017 [employer]. [address] [place] [address], [place] [address]. All rights reserved. This information is not intended as a substitute for professional medical care. Always follow your healthcare professional's instructions.

## 2019-11-06 NOTE — PATIENT INSTRUCTIONS
STD Screening  You were tested for STDs today, we will call you in 5-7 days with the results of the testing  You were treated for chlamydia today  Please take all antibiotics as directed to completion  If you need more medication when the labs result come in, we will call you and phone them in for you.    Increase condom use to prevent occurance.      IF POSITIVE:  Notify sexual partners of the need for testing.    Complete ALL medication prescribed.    NO sexual intercourse for 7 days after treatment.   Retest to ensure infection has cleared-there are infections that require more agressive treatment.  Retest for all in 6 weeks and again in 6 months to ensure true negative results.      Today's testing will give no crediable information if you have unprotected sexual activities going forward.         Chlamydia  Chlamydia is a very common sexually transmitted disease (STD). Most people do not have symptoms. Because of this, chlamydia may not be noticed until it causes severe problems. Left untreated, this infection can cause women and men to become sterile. This means they will not be able to have children.    Symptoms  Many people with chlamydia have no symptoms. Women are more likely than men not to have symptoms.  If symptoms show up in women, they include:  · Abnormal vaginal discharge  · Bleeding between periods  · Pain or burning during urination  If symptoms show up in men, they include:  · Clear discharge (drip) from the penis or anus  · Pain or burning during urination  These symptoms usually disappear after a few weeks, whether or not you are treated. However, if you are not treated, the chlamydia will still be present and can cause long-term problems.  Potential problems  If the infection is not treated, it can lead to more serious health problems. In women, this can be pelvic inflammatory disease (PID). PID can make a woman sterile. It can also cause an ectopic (tubal) pregnancy. This type of pregnancy  cannot be carried to term. Symptoms of PID include fever, pain during sex, and pain in the belly.  Sexually active women should get checked for chlamydia regularly. This can help prevent PID.   Treatment  When found early, chlamydia can be treated. It can be cured with antibiotic medicines. If you have it, tell your partner right away. Because women often dont have symptoms, men should ask their partners to get tested.  Prevention  Know your partners history. Protect yourself by using a latex condom whenever you have sex. If you are pregnant, take extra care to get proper treatment. Untreated chlamydia in a pregnant woman can pass the infection on to the baby, causing possible eye, ear, or lung problems. There is also the possibility of a premature delivery.  Resources  American Social Health Association STD Hotline  982.859.8581  www.ashastd.org  Centers for Disease Control and Prevention  487.967.2363  www.cdc.gov/std   Date Last Reviewed: 12/1/2016  © 6493-4580 [employer]. [address] [place] [address], [place] [address]. All rights reserved. This information is not intended as a substitute for professional medical care. Always follow your healthcare professional's instructions.

## 2019-11-08 LAB
C TRACH RRNA SPEC QL NAA+PROBE: NEGATIVE
N GONORRHOEA RRNA SPEC QL NAA+PROBE: NEGATIVE

## 2019-11-09 ENCOUNTER — TELEPHONE (OUTPATIENT)
Dept: URGENT CARE | Facility: CLINIC | Age: 24
End: 2019-11-09

## 2019-11-11 LAB
BACTERIA UR CULT: NO GROWTH
BACTERIA UR CULT: NORMAL

## 2019-11-12 ENCOUNTER — TELEPHONE (OUTPATIENT)
Dept: URGENT CARE | Facility: CLINIC | Age: 24
End: 2019-11-12

## 2019-11-12 NOTE — TELEPHONE ENCOUNTER
----- Message from Polo Jeronimo DNP sent at 11/11/2019  3:58 PM CST -----  Please call the patient regarding his normal urine culture result.

## 2020-03-18 ENCOUNTER — OFFICE VISIT (OUTPATIENT)
Dept: FAMILY MEDICINE | Facility: CLINIC | Age: 25
End: 2020-03-18
Payer: COMMERCIAL

## 2020-03-18 VITALS
TEMPERATURE: 98 F | SYSTOLIC BLOOD PRESSURE: 120 MMHG | OXYGEN SATURATION: 99 % | HEIGHT: 70 IN | DIASTOLIC BLOOD PRESSURE: 80 MMHG | WEIGHT: 198.19 LBS | HEART RATE: 69 BPM | BODY MASS INDEX: 28.37 KG/M2

## 2020-03-18 DIAGNOSIS — J02.9 SORE THROAT: Primary | ICD-10-CM

## 2020-03-18 DIAGNOSIS — J06.9 VIRAL URI WITH COUGH: ICD-10-CM

## 2020-03-18 PROCEDURE — 99214 OFFICE O/P EST MOD 30 MIN: CPT | Mod: S$GLB,,, | Performed by: NURSE PRACTITIONER

## 2020-03-18 PROCEDURE — 3008F PR BODY MASS INDEX (BMI) DOCUMENTED: ICD-10-PCS | Mod: CPTII,S$GLB,, | Performed by: NURSE PRACTITIONER

## 2020-03-18 PROCEDURE — 99999 PR PBB SHADOW E&M-EST. PATIENT-LVL III: CPT | Mod: PBBFAC,,, | Performed by: NURSE PRACTITIONER

## 2020-03-18 PROCEDURE — 99999 PR PBB SHADOW E&M-EST. PATIENT-LVL III: ICD-10-PCS | Mod: PBBFAC,,, | Performed by: NURSE PRACTITIONER

## 2020-03-18 PROCEDURE — 3008F BODY MASS INDEX DOCD: CPT | Mod: CPTII,S$GLB,, | Performed by: NURSE PRACTITIONER

## 2020-03-18 PROCEDURE — 99214 PR OFFICE/OUTPT VISIT, EST, LEVL IV, 30-39 MIN: ICD-10-PCS | Mod: S$GLB,,, | Performed by: NURSE PRACTITIONER

## 2020-03-18 NOTE — PROGRESS NOTES
The for she might she might be she Subjective:       Patient ID: Edwar Hung Jr. is a 24 y.o. male.    Chief Complaint: Cough (2 days); Sore Throat (2 days); Headache (1 day); and Nasal Congestion (2 dats)    24-year-old male presents to the clinic today with complaint of sore throat, sinus congestion, the the coughing, and headaches for the past 2 days.  He denies any fever, chills, ear pain, wheezing, shortness of breath, abdominal pain, nausea, vomiting, or diarrhea.  He denies any dizziness or blurred vision.  He denies any cardiac chest pain, heart palpitations, or swelling to lower extremities.  He has been taking DayQuil with some relief. However, he has no known exposure to the corona virus.    History reviewed. No pertinent past medical history.  History reviewed. No pertinent surgical history.   reports that he has never smoked. He has never used smokeless tobacco. He reports that he does not drink alcohol or use drugs.  Review of Systems   Constitutional: Negative for chills, fatigue and fever.   HENT: Positive for sore throat. Negative for congestion, ear discharge, ear pain, nosebleeds, postnasal drip, rhinorrhea, sinus pressure and sneezing.    Respiratory: Positive for cough. Negative for shortness of breath and wheezing.    Cardiovascular: Negative for chest pain, palpitations and leg swelling.   Gastrointestinal: Negative for abdominal pain, constipation, diarrhea, nausea and vomiting.   Musculoskeletal: Negative for back pain, gait problem and neck pain.   Skin: Negative for color change and rash.   Neurological: Positive for headaches. Negative for dizziness and light-headedness.       Objective:      Physical Exam   Constitutional: He is oriented to person, place, and time. He appears well-developed and well-nourished. No distress.   HENT:   Head: Normocephalic and atraumatic.   Right Ear: External ear normal.   Left Ear: External ear normal.   Nose: Nose normal.   Mouth/Throat: No  oropharyngeal exudate.   Pharynx mild redness with a post nasal drip    Eyes: Pupils are equal, round, and reactive to light. Conjunctivae and EOM are normal. Right eye exhibits no discharge. Left eye exhibits no discharge. No scleral icterus.   Neck: Normal range of motion. Neck supple.   Cardiovascular: Normal rate and regular rhythm. Exam reveals no gallop and no friction rub.   No murmur heard.  Pulmonary/Chest: Effort normal and breath sounds normal. No respiratory distress. He has no wheezes. He has no rales.   Abdominal: Soft. Bowel sounds are normal. There is no tenderness.   Musculoskeletal: Normal range of motion. He exhibits no edema.   Lymphadenopathy:     He has no cervical adenopathy.   Neurological: He is alert and oriented to person, place, and time.   Skin: Skin is warm and dry. No rash noted. He is not diaphoretic.   Psychiatric: He has a normal mood and affect.       Assessment:       1. Sore throat    2. Viral URI with cough        Plan:         Sore throat  -     POCT Rapid Strep A  - Rapid flu negative    Viral URI with cough  - Claritin 10 mg every am   - ok to continue Dayquil and Nyquil

## 2020-03-18 NOTE — LETTER
March 18, 2020      [facility]  [address]  [place] [address]  Phone: [phone]  Fax: [phone]       Patient: Edwar Hung   YOB: 1995  Date of Visit: 03/18/2020    To Whom It May Concern:    Messi Hung  was at [facility] on 03/18/2020. He may return to work/school on 3/18/2020 with no restrictions. He does not have any symptoms time.  to test for the corona virus due to CDC recommendations at this time. If you have any questions or concerns, or if I can be of further assistance, please do not hesitate to contact me.    Sincerely,      INOCENCIO LynchC

## 2020-03-18 NOTE — PATIENT INSTRUCTIONS
Continue Dayquil and can use Nyquil as needed at night for cough  Start Claritin 10 mg daily for post nasal drip

## 2020-03-18 NOTE — LETTER
March 18, 2020      [facility]  [address]  [place] [address]  Phone: [phone]  Fax: [phone]       Patient: Edwar Hung   YOB: 1995  Date of Visit: 03/18/2020    To Whom It May Concern:    Messi Hung  was at [facility] on 03/18/2020. He may return to work/school on 3/18/2020  with no restrictions.  He does not have any symptoms at this time to test for the corona virus according to the CDC recommendations.  If you have any questions or concerns, or if I can be of further assistance, please do not hesitate to contact me.    Sincerely,      INOCENCIO LynchC

## 2020-04-01 ENCOUNTER — OFFICE VISIT (OUTPATIENT)
Dept: FAMILY MEDICINE | Facility: CLINIC | Age: 25
End: 2020-04-01
Payer: COMMERCIAL

## 2020-04-01 VITALS
HEART RATE: 70 BPM | BODY MASS INDEX: 28.72 KG/M2 | TEMPERATURE: 98 F | OXYGEN SATURATION: 98 % | WEIGHT: 200.63 LBS | SYSTOLIC BLOOD PRESSURE: 118 MMHG | DIASTOLIC BLOOD PRESSURE: 76 MMHG | HEIGHT: 70 IN

## 2020-04-01 DIAGNOSIS — E66.3 OVERWEIGHT (BMI 25.0-29.9): ICD-10-CM

## 2020-04-01 DIAGNOSIS — J30.89 NON-SEASONAL ALLERGIC RHINITIS DUE TO OTHER ALLERGIC TRIGGER: ICD-10-CM

## 2020-04-01 DIAGNOSIS — Z00.00 ANNUAL PHYSICAL EXAM: Primary | ICD-10-CM

## 2020-04-01 DIAGNOSIS — Z23 ENCOUNTER FOR ADMINISTRATION OF VACCINE: ICD-10-CM

## 2020-04-01 PROCEDURE — 99999 PR PBB SHADOW E&M-EST. PATIENT-LVL III: ICD-10-PCS | Mod: PBBFAC,,, | Performed by: FAMILY MEDICINE

## 2020-04-01 PROCEDURE — 99999 PR PBB SHADOW E&M-EST. PATIENT-LVL III: CPT | Mod: PBBFAC,,, | Performed by: FAMILY MEDICINE

## 2020-04-01 PROCEDURE — 90714 TD VACC NO PRESV 7 YRS+ IM: CPT | Mod: S$GLB,,, | Performed by: FAMILY MEDICINE

## 2020-04-01 PROCEDURE — 90471 IMMUNIZATION ADMIN: CPT | Mod: S$GLB,,, | Performed by: FAMILY MEDICINE

## 2020-04-01 PROCEDURE — 90714 TD VACCINE GREATER THAN OR EQUAL TO 7YO PRESERVATIVE FREE IM: ICD-10-PCS | Mod: S$GLB,,, | Performed by: FAMILY MEDICINE

## 2020-04-01 PROCEDURE — 99395 PREV VISIT EST AGE 18-39: CPT | Mod: 25,S$GLB,, | Performed by: FAMILY MEDICINE

## 2020-04-01 PROCEDURE — 99395 PR PREVENTIVE VISIT,EST,18-39: ICD-10-PCS | Mod: 25,S$GLB,, | Performed by: FAMILY MEDICINE

## 2020-04-01 PROCEDURE — 90471 TD VACCINE GREATER THAN OR EQUAL TO 7YO PRESERVATIVE FREE IM: ICD-10-PCS | Mod: S$GLB,,, | Performed by: FAMILY MEDICINE

## 2020-04-01 NOTE — PROGRESS NOTES
Office Visit    Patient Name: Edwar Hung Jr.    : 1995  MRN: [id number]      Assessment/Plan:  Edwar Hung Jr. is a 24 y.o. male who presents today for :    Annual physical exam  -     Hemoglobin A1c; Future; Expected date: 2020  -     CBC Without Differential; Future; Expected date: 2020  -     Comprehensive metabolic panel; Future; Expected date: 2020  -     Lipid panel; Future; Expected date: 2020  -     HIV 1/2 Ag/Ab (4th Gen); Future; Expected date: 2020  Overweight (BMI 25.0-29.9)  Encounter for administration of vaccine  -     Td Vaccine (Adult) - Preservative Free  -anticipatory guidance provided with age appropriate preventative services discussed, healthy diet and regular physical exercise also discussed with patient  -any additional health maintenance will be readdressed at the next physical if declined or deferred by the patient today   -Recommend 15-30 minutes of moderate intensity exercise 5 days/week.    Non-seasonal allergic rhinitis due to other allergic trigger  -mild, start anti-histamine PRN daily  -use Flonase daily in the morning as needed  -discussed use of air humidifier/filter at home, changing AC filters regularly, avoid second-hand smoking.   -supportive therapy and symptomatic management.   -f/u as needed               Follow up PRN    This note was created by combination of typed  and MModal dictation.  Transcription errors may be present.  If there are any questions, please contact me.        ----------------------------------------------------------------------------------------------------------------------      HPI:  Patient Care Team:  Mathieu Alvarez MD as PCP - General (Family Medicine)  Veronique Chan MA as Care Coordinator    Edwar is a 24 y.o. male with      Patient Active Problem List   Diagnosis    Overweight (BMI 25.0-29.9)    Non-seasonal allergic rhinitis         Patient presents today for:  Annual Exam         Patient is doing well and has no major complaints today except for minor sinus allergies with recent increased pollen count due to warm weather, for which he takes OTC meds as needed. Health maintenance-wise, he is due for routine bloodwork. Otherwise, no major new changes in health since last checkup. He denies any cardiovascular or neurologic complaints today        Additional ROS  No F/C/wt changes/fatigue  No dysphagia/sore throat/rhinorrhea  No CP/SHULTZ/palpitations/swelling  No cough/wheezing/SOB  No nausea/vomiting/abd pain/no diarrhea, no constipation, no blood in stool  No muscle aches/joint pain except for mild R wrist sprain from a month ago that has mostly resolved  No rashes  No MSK weakness/HA/tingling/numbness  No anxiety/depression  No dysuria/hematuria  No polyuria/polydipsia/fatigue/cold or hot intolerance          Current Medications  Medications reviewed and updated.     No current outpatient medications on file.    History reviewed. No pertinent surgical history.    Family History   Problem Relation Age of Onset    Heart disease Father        Social History     Socioeconomic History    Marital status: Single     Spouse name: Not on file    Number of children: Not on file    Years of education: Not on file    Highest education level: Not on file   Occupational History    Not on file   Social Needs    Financial resource strain: Not very hard    Food insecurity:     Worry: Never true     Inability: Never true    Transportation needs:     Medical: No     Non-medical: No   Tobacco Use    Smoking status: Never Smoker    Smokeless tobacco: Never Used   Substance and Sexual Activity    Alcohol use: No     Frequency: 2-4 times a month     Drinks per session: 1 or 2     Binge frequency: Less than monthly    Drug use: No    Sexual activity: Yes   Lifestyle    Physical activity:     Days per week: 5 days     Minutes per session: 60 min    Stress: Only a little   Relationships    Social  "connections:     Talks on phone: More than three times a week     Gets together: Once a week     Attends [employer]al service: Not on file     Active member of club or organization: Yes     Attends meetings of clubs or organizations: More than 4 times per year     Relationship status: Never    Other Topics Concern    Not on file   Social History Narrative    Not on file           Allergies   Review of patient's allergies indicates:   Allergen Reactions    Nsaids (non-steroidal anti-inflammatory drug)              Review of Systems  See HPI      Physical Exam  /76 (BP Location: Left arm, Patient Position: Sitting, BP Method: Large (Automatic))   Pulse 70   Temp 98.1 °F (36.7 °C) (Oral)   Ht 5' 10" (1.778 m)   Wt 91 kg (200 lb 9.9 oz)   SpO2 98%   BMI 28.79 kg/m²     GEN: NAD, well developed, pleasant, well nourished  HEENT: NCAT, PERRLA, EOMI, sclera clear, anicteric, bilateral ear exam wnl, O/P with mild PND but otherwise clear, MMM with no lesions  NECK: normal, supple with midline trachea, no LAD, no thyromegaly  LUNGS: CTAB, no w/r/r, no increased work of breathing   HEART: RRR, normal S1 and S2, no m/r/g, no edema  ABD: s/nt/nd, NABS  SKIN: normal turgor, no rashes  PSYCH: AOx3, appropriate mood and affect  MSK: warm/well perfused, normal ROM in all extremities, no c/c/e.  NEURO: normal without focal findings, CN II-XII are grossly intact.  Sensation/strength grossly normal, gait and station normal.         Labs  No results found for: LABA1C, HGBA1C  No results found for: NA, K, CL, CO2, BUN, CREATININE, CALCIUM, ANIONGAP, ESTGFRAFRICA, EGFRNONAA  No results found for: CHOL  No results found for: HDL  No results found for: LDLCALC  No results found for: TRIG  No results found for: CHOLHDL  Last set of blood work has been reviewed as noted above.                "

## 2020-08-14 DIAGNOSIS — Z11.59 NEED FOR HEPATITIS C SCREENING TEST: ICD-10-CM

## 2021-01-02 ENCOUNTER — CLINICAL SUPPORT (OUTPATIENT)
Dept: URGENT CARE | Facility: CLINIC | Age: 26
End: 2021-01-02
Payer: COMMERCIAL

## 2021-01-02 VITALS — RESPIRATION RATE: 18 BRPM | OXYGEN SATURATION: 99 % | TEMPERATURE: 99 F

## 2021-01-02 DIAGNOSIS — Z20.822 EXPOSURE TO COVID-19 VIRUS: Primary | ICD-10-CM

## 2021-01-02 DIAGNOSIS — U07.1 COVID-19 VIRUS DETECTED: ICD-10-CM

## 2021-01-02 LAB
CTP QC/QA: YES
SARS-COV-2 RDRP RESP QL NAA+PROBE: POSITIVE

## 2021-01-02 PROCEDURE — U0002: ICD-10-PCS | Mod: QW,S$GLB,, | Performed by: FAMILY MEDICINE

## 2021-01-02 PROCEDURE — U0002 COVID-19 LAB TEST NON-CDC: HCPCS | Mod: QW,S$GLB,, | Performed by: FAMILY MEDICINE

## 2021-04-06 ENCOUNTER — PATIENT MESSAGE (OUTPATIENT)
Dept: ADMINISTRATIVE | Facility: HOSPITAL | Age: 26
End: 2021-04-06

## 2021-05-03 ENCOUNTER — IMMUNIZATION (OUTPATIENT)
Dept: INTERNAL MEDICINE | Facility: CLINIC | Age: 26
End: 2021-05-03
Payer: COMMERCIAL

## 2021-05-03 DIAGNOSIS — Z23 NEED FOR VACCINATION: Primary | ICD-10-CM

## 2021-05-03 PROCEDURE — 91300 COVID-19, MRNA, LNP-S, PF, 30 MCG/0.3 ML DOSE VACCINE: CPT | Mod: PBBFAC | Performed by: INTERNAL MEDICINE

## 2021-05-24 ENCOUNTER — IMMUNIZATION (OUTPATIENT)
Dept: INTERNAL MEDICINE | Facility: CLINIC | Age: 26
End: 2021-05-24
Payer: COMMERCIAL

## 2021-05-24 DIAGNOSIS — Z23 NEED FOR VACCINATION: Primary | ICD-10-CM

## 2021-05-24 PROCEDURE — 0002A COVID-19, MRNA, LNP-S, PF, 30 MCG/0.3 ML DOSE VACCINE: CPT | Mod: PBBFAC | Performed by: INTERNAL MEDICINE

## 2021-05-24 PROCEDURE — 91300 COVID-19, MRNA, LNP-S, PF, 30 MCG/0.3 ML DOSE VACCINE: CPT | Mod: PBBFAC | Performed by: INTERNAL MEDICINE

## 2021-07-07 ENCOUNTER — PATIENT MESSAGE (OUTPATIENT)
Dept: ADMINISTRATIVE | Facility: HOSPITAL | Age: 26
End: 2021-07-07

## 2022-03-16 DIAGNOSIS — Z11.59 NEED FOR HEPATITIS C SCREENING TEST: ICD-10-CM

## 2022-03-29 ENCOUNTER — PATIENT MESSAGE (OUTPATIENT)
Dept: RESEARCH | Facility: HOSPITAL | Age: 27
End: 2022-03-29
Payer: COMMERCIAL

## 2022-05-10 ENCOUNTER — OFFICE VISIT (OUTPATIENT)
Dept: FAMILY MEDICINE | Facility: CLINIC | Age: 27
End: 2022-05-10
Payer: COMMERCIAL

## 2022-05-10 VITALS
DIASTOLIC BLOOD PRESSURE: 82 MMHG | BODY MASS INDEX: 30.21 KG/M2 | OXYGEN SATURATION: 99 % | TEMPERATURE: 98 F | HEIGHT: 70 IN | HEART RATE: 70 BPM | SYSTOLIC BLOOD PRESSURE: 128 MMHG | WEIGHT: 211 LBS

## 2022-05-10 DIAGNOSIS — M25.561 CHRONIC PAIN OF BOTH KNEES: ICD-10-CM

## 2022-05-10 DIAGNOSIS — M25.562 CHRONIC PAIN OF BOTH KNEES: ICD-10-CM

## 2022-05-10 DIAGNOSIS — E66.9 OBESITY (BMI 30-39.9): ICD-10-CM

## 2022-05-10 DIAGNOSIS — Z00.00 ANNUAL PHYSICAL EXAM: Primary | ICD-10-CM

## 2022-05-10 DIAGNOSIS — G89.29 CHRONIC PAIN OF BOTH KNEES: ICD-10-CM

## 2022-05-10 PROCEDURE — 99999 PR PBB SHADOW E&M-EST. PATIENT-LVL IV: ICD-10-PCS | Mod: PBBFAC,,, | Performed by: FAMILY MEDICINE

## 2022-05-10 PROCEDURE — 3074F PR MOST RECENT SYSTOLIC BLOOD PRESSURE < 130 MM HG: ICD-10-PCS | Mod: CPTII,S$GLB,, | Performed by: FAMILY MEDICINE

## 2022-05-10 PROCEDURE — 99999 PR PBB SHADOW E&M-EST. PATIENT-LVL IV: CPT | Mod: PBBFAC,,, | Performed by: FAMILY MEDICINE

## 2022-05-10 PROCEDURE — 3074F SYST BP LT 130 MM HG: CPT | Mod: CPTII,S$GLB,, | Performed by: FAMILY MEDICINE

## 2022-05-10 PROCEDURE — 1159F PR MEDICATION LIST DOCUMENTED IN MEDICAL RECORD: ICD-10-PCS | Mod: CPTII,S$GLB,, | Performed by: FAMILY MEDICINE

## 2022-05-10 PROCEDURE — 99395 PR PREVENTIVE VISIT,EST,18-39: ICD-10-PCS | Mod: S$GLB,,, | Performed by: FAMILY MEDICINE

## 2022-05-10 PROCEDURE — 99214 PR OFFICE/OUTPT VISIT, EST, LEVL IV, 30-39 MIN: ICD-10-PCS | Mod: 25,S$GLB,, | Performed by: FAMILY MEDICINE

## 2022-05-10 PROCEDURE — 99214 OFFICE O/P EST MOD 30 MIN: CPT | Mod: 25,S$GLB,, | Performed by: FAMILY MEDICINE

## 2022-05-10 PROCEDURE — 99395 PREV VISIT EST AGE 18-39: CPT | Mod: S$GLB,,, | Performed by: FAMILY MEDICINE

## 2022-05-10 PROCEDURE — 1159F MED LIST DOCD IN RCRD: CPT | Mod: CPTII,S$GLB,, | Performed by: FAMILY MEDICINE

## 2022-05-10 PROCEDURE — 3008F BODY MASS INDEX DOCD: CPT | Mod: CPTII,S$GLB,, | Performed by: FAMILY MEDICINE

## 2022-05-10 PROCEDURE — 3008F PR BODY MASS INDEX (BMI) DOCUMENTED: ICD-10-PCS | Mod: CPTII,S$GLB,, | Performed by: FAMILY MEDICINE

## 2022-05-10 PROCEDURE — 1160F PR REVIEW ALL MEDS BY PRESCRIBER/CLIN PHARMACIST DOCUMENTED: ICD-10-PCS | Mod: CPTII,S$GLB,, | Performed by: FAMILY MEDICINE

## 2022-05-10 PROCEDURE — 1160F RVW MEDS BY RX/DR IN RCRD: CPT | Mod: CPTII,S$GLB,, | Performed by: FAMILY MEDICINE

## 2022-05-10 PROCEDURE — 3079F DIAST BP 80-89 MM HG: CPT | Mod: CPTII,S$GLB,, | Performed by: FAMILY MEDICINE

## 2022-05-10 PROCEDURE — 3079F PR MOST RECENT DIASTOLIC BLOOD PRESSURE 80-89 MM HG: ICD-10-PCS | Mod: CPTII,S$GLB,, | Performed by: FAMILY MEDICINE

## 2022-05-10 NOTE — PROGRESS NOTES
"  Physical Exam  /82   Pulse 70   Temp 98.2 °F (36.8 °C) (Oral)   Ht 5' 10" (1.778 m)   Wt 95.7 kg (210 lb 15.7 oz)   SpO2 99%   BMI 30.27 kg/m²      Office Visit    Patient Name: Edwar Hung Jr.    : 1995  MRN: [id number]      Assessment/Plan:  Edwar Hung Jr. is a 26 y.o. male who presents today for :    -Annual physical exam  -     Hemoglobin A1C; Future; Expected date: 05/10/2022  -     CBC Without Differential; Future; Expected date: 05/10/2022  -     Comprehensive Metabolic Panel; Future; Expected date: 05/10/2022  -     Lipid Panel; Future; Expected date: 05/10/2022  -Obesity (BMI 30-39.9)  -anticipatory guidance provided with age appropriate preventative services discussed, healthy diet and regular physical exercise also discussed with patient  -d/w pt about the importance of portion control        Follow up PRN          Additional Evaluation & Management issues:     In addition to today's Annual Physical, patient has other medical issues that need to be addressed, as well as their associated prescription management that is separate from today's Physical  - as documented separately below the Annual Physical portion of this encounter.        This note was created by combination of typed  and MModal dictation.  Transcription errors may be present.  If there are any questions, please contact me.        ----------------------------------------------------------------------------------------------------------------------      HPI:  Patient Care Team:  Mathieu Alvarez MD as PCP - General (Family Medicine)  Veronique Chan MA (Inactive) as Care Coordinator    Edwar is a 26 y.o. male with      Patient Active Problem List   Diagnosis    Overweight (BMI 25.0-29.9)    Non-seasonal allergic rhinitis    -Annual physical exam    -Obesity (BMI 30-39.9)    -Chronic pain of both knees - follows         Edwar has PMHx as noted above and presents today for:  Knee Pain      In " addition to addressing the reason for this office visit as above, which is further discussed and addressed in the separate E&M section of this note, patient also due for routine Annual bloodwork today. Health maintenance-wise, he is up to date on his Flu and COVID vaccinations. He denies any cardiovascular or neurologic complaints today          Answers for HPI/ROS submitted by the patient on 5/10/2022  activity change: No  unexpected weight change: No  neck pain: No  hearing loss: No  rhinorrhea: No  trouble swallowing: No  eye discharge: No  visual disturbance: No  chest tightness: No  wheezing: No  chest pain: No  palpitations: No  blood in stool: No  constipation: No  vomiting: No  diarrhea: No  polydipsia: No  polyuria: No  difficulty urinating: No  urgency: No  hematuria: No  joint swelling: No  arthralgias: Yes  headaches: No  weakness: No  confusion: No  dysphoric mood: No            Current Medications  Medications reviewed and updated.     No current outpatient medications on file.    History reviewed. No pertinent surgical history.    Family History   Problem Relation Age of Onset    Heart disease Father        Social History     Socioeconomic History    Marital status: Single   Tobacco Use    Smoking status: Never Smoker    Smokeless tobacco: Never Used   Substance and Sexual Activity    Alcohol use: No    Drug use: No    Sexual activity: Yes     Social Determinants of Health     Financial Resource Strain: Low Risk     Difficulty of Paying Living Expenses: Not hard at all   Food Insecurity: No Food Insecurity    Worried About Running Out of Food in the Last Year: Never true    Ran Out of Food in the Last Year: Never true   Transportation Needs: No Transportation Needs    Lack of Transportation (Medical): No    Lack of Transportation (Non-Medical): No   Physical Activity: Insufficiently Active    Days of Exercise per Week: 4 days    Minutes of Exercise per Session: 30 min   Stress: No Stress  "Concern Present    Feeling of Stress : Not at all   Social Connections: Unknown    Frequency of Communication with Friends and Family: Three times a week    Frequency of Social Gatherings with Friends and Family: More than three times a week    Active Member of Clubs or Organizations: Yes    Attends Club or Organization Meetings: More than 4 times per year    Marital Status: Never    Housing Stability: Low Risk     Unable to Pay for Housing in the Last Year: No    Number of Places Lived in the Last Year: 1    Unstable Housing in the Last Year: No           Allergies   Review of patient's allergies indicates:   Allergen Reactions    Nsaids (non-steroidal anti-inflammatory drug)              Review of Systems  See HPI      [unfilled]  /82   Pulse 70   Temp 98.2 °F (36.8 °C) (Oral)   Ht 5' 10" (1.778 m)   Wt 95.7 kg (210 lb 15.7 oz)   SpO2 99%   BMI 30.27 kg/m²       GEN: NAD, well developed, pleasant, well nourished  HEENT: NCAT, PERRLA, EOMI, sclera clear, anicteric, bilateral ear exam wnl, O/P clear, MMM with no lesions  NECK: normal, supple with midline trachea, no LAD, no thyromegaly  LUNGS: CTAB, no w/r/r, no increased work of breathing   HEART: RRR, normal S1 and S2, no m/r/g, no edema  ABD: s/nt/nd, NABS  SKIN: normal turgor, no rashes  PSYCH: AOx3, appropriate mood and affect  MSK: warm/well perfused, normal ROM in all extremities, no c/c/e.  NEURO: normal without focal findings, CN II-XII are grossly intact.  Sensation/strength grossly normal, gait and station normal.         __________________________________________________________________________________________________________________________________      Additional Evaluation & Management issues:     In addition to today's Annual Physical, patient has other medical issues that need to be addressed, as well as their associated prescription management that is separate from today's Physical  - as documented separately " "below      HPI:    Edwar has PMHx as noted above and presents today for:  Knee Pain    Which is a chronic recurrent issue for him and has been present for 18+ years as he previously played catcher since elementary school until high school. He has never had a medication evaluation, but would like to get it checked as he recently aggravated his R knee while jumping off a height of 3 feet. He feels that there's a click when he tried to bend his knee. He denies any swelling/redness. He is able to support his weight and walk without difficulty, with pain mostly from bending/twisting his knee a certain way. No radiation of pain.no tingling/numbness/weakness          Additional ROS    As per HPI          Physical Exam  /82   Pulse 70   Temp 98.2 °F (36.8 °C) (Oral)   Ht 5' 10" (1.778 m)   Wt 95.7 kg (210 lb 15.7 oz)   SpO2 99%   BMI 30.27 kg/m²       GEN: NAD, well developed, pleasant, well nourished  HEENT: NCAT, PERRLA, EOMI, sclera clear, anicteric  NECK: normal, supple  SKIN: normal turgor, no rashes  PSYCH: AOx3, appropriate mood and affect  MSK: warm/well perfused, normal ROM in all extremities except as noted below, no c/c  KNEE: no gross abnormality on visual exam, bilateral knee with FROM. Bilateral knees with with mild tenderness to deep palpation. +mild crepitus, no joint line TTP. Normal valgus/varus stress.  NEG anterior/posterior drawer and Lachman's test. No laxity. Normal gait. Calf and thigh are nonTTP. Neurovascularly intact distally. No effusion/erythema.        Assessment/Plan:  Edwar Hung Jr. is a 26 y.o. male who presents today for :    -Chronic pain of both knees  -     X-Ray Knee 1 or 2 View Bilateral; Future; Expected date: 05/10/2022  -     Ambulatory referral/consult to Orthopedics; Future; Expected date: 05/17/2022  -this is a chronic recurrent issue for patient, we discussed taking Tylenol as needed for now as he does not tolerate NSAIDs. Given long history and recurrence " of pain, I advised obtaining imaging and getting further evaluation with Ortho. We also discussed activity modification and avoid triggers. May use brace for additional support  -RTC in 4-6 weeks if not better, or sooner if pain worsens.

## 2022-05-13 DIAGNOSIS — G89.29 CHRONIC PAIN OF BOTH KNEES: Primary | ICD-10-CM

## 2022-05-13 DIAGNOSIS — M25.562 CHRONIC PAIN OF BOTH KNEES: Primary | ICD-10-CM

## 2022-05-13 DIAGNOSIS — M25.561 CHRONIC PAIN OF BOTH KNEES: Primary | ICD-10-CM

## 2022-05-16 ENCOUNTER — OFFICE VISIT (OUTPATIENT)
Dept: ORTHOPEDICS | Facility: CLINIC | Age: 27
End: 2022-05-16
Attending: ORTHOPAEDIC SURGERY
Payer: COMMERCIAL

## 2022-05-16 VITALS
HEART RATE: 70 BPM | OXYGEN SATURATION: 99 % | RESPIRATION RATE: 15 BRPM | SYSTOLIC BLOOD PRESSURE: 125 MMHG | WEIGHT: 210 LBS | BODY MASS INDEX: 30.06 KG/M2 | HEIGHT: 70 IN | DIASTOLIC BLOOD PRESSURE: 86 MMHG

## 2022-05-16 DIAGNOSIS — G89.29 CHRONIC PAIN OF BOTH KNEES: ICD-10-CM

## 2022-05-16 DIAGNOSIS — M25.561 CHRONIC PAIN OF BOTH KNEES: ICD-10-CM

## 2022-05-16 DIAGNOSIS — M25.562 CHRONIC PAIN OF BOTH KNEES: ICD-10-CM

## 2022-05-16 DIAGNOSIS — S83.249A TEAR OF MEDIAL MENISCUS OF KNEE, CURRENT, UNSPECIFIED LATERALITY, UNSPECIFIED TEAR TYPE, INITIAL ENCOUNTER: Primary | ICD-10-CM

## 2022-05-16 PROCEDURE — 3079F PR MOST RECENT DIASTOLIC BLOOD PRESSURE 80-89 MM HG: ICD-10-PCS | Mod: CPTII,S$GLB,, | Performed by: ORTHOPAEDIC SURGERY

## 2022-05-16 PROCEDURE — 1159F PR MEDICATION LIST DOCUMENTED IN MEDICAL RECORD: ICD-10-PCS | Mod: CPTII,S$GLB,, | Performed by: ORTHOPAEDIC SURGERY

## 2022-05-16 PROCEDURE — 1159F MED LIST DOCD IN RCRD: CPT | Mod: CPTII,S$GLB,, | Performed by: ORTHOPAEDIC SURGERY

## 2022-05-16 PROCEDURE — 3008F PR BODY MASS INDEX (BMI) DOCUMENTED: ICD-10-PCS | Mod: CPTII,S$GLB,, | Performed by: ORTHOPAEDIC SURGERY

## 2022-05-16 PROCEDURE — 3079F DIAST BP 80-89 MM HG: CPT | Mod: CPTII,S$GLB,, | Performed by: ORTHOPAEDIC SURGERY

## 2022-05-16 PROCEDURE — 99203 OFFICE O/P NEW LOW 30 MIN: CPT | Mod: S$GLB,,, | Performed by: ORTHOPAEDIC SURGERY

## 2022-05-16 PROCEDURE — 99203 PR OFFICE/OUTPT VISIT, NEW, LEVL III, 30-44 MIN: ICD-10-PCS | Mod: S$GLB,,, | Performed by: ORTHOPAEDIC SURGERY

## 2022-05-16 PROCEDURE — 3008F BODY MASS INDEX DOCD: CPT | Mod: CPTII,S$GLB,, | Performed by: ORTHOPAEDIC SURGERY

## 2022-05-16 PROCEDURE — 3074F PR MOST RECENT SYSTOLIC BLOOD PRESSURE < 130 MM HG: ICD-10-PCS | Mod: CPTII,S$GLB,, | Performed by: ORTHOPAEDIC SURGERY

## 2022-05-16 PROCEDURE — 99999 PR PBB SHADOW E&M-EST. PATIENT-LVL III: CPT | Mod: PBBFAC,,, | Performed by: ORTHOPAEDIC SURGERY

## 2022-05-16 PROCEDURE — 99999 PR PBB SHADOW E&M-EST. PATIENT-LVL III: ICD-10-PCS | Mod: PBBFAC,,, | Performed by: ORTHOPAEDIC SURGERY

## 2022-05-16 PROCEDURE — 3074F SYST BP LT 130 MM HG: CPT | Mod: CPTII,S$GLB,, | Performed by: ORTHOPAEDIC SURGERY

## 2022-05-16 RX ORDER — METHYLPREDNISOLONE 4 MG/1
TABLET ORAL
Qty: 21 EACH | Refills: 0 | Status: SHIPPED | OUTPATIENT
Start: 2022-05-16 | End: 2022-06-06

## 2022-05-16 NOTE — PROGRESS NOTES
NEW PATIENT ORTHOPAEDIC: Knee    PRIMARY CARE PHYSICIAN: Mathieu Alvarez MD   REFERRING PROVIDER: Mathieu Alvarez MD  [address]  [place] [address]     ASSESSMENT & PLAN:    Impression:  Bilateral Knee Medial meniscal tear     Follow Up Plan: PRN       Non operative care:    Edwar Hung Jr. has physical exam evidence of above and wishes to pursue an non-operative care. I am recommending the following: medrol dose pack.  Patient has bilateral chronic knee pain.  This started in his left is numb is weight to his right.  There is no associated injury.  He does report she has General wearing tear and used to be a catcher in baseball.  He has a manual labor job.  He notices some intermittent popping and clicking of his knee which is not painful.  Pains primarily directed over the medial aspect of both knees.  He has a history of G6PD deficiency and is unable to tolerate anti-inflammatories.  I think that these would have been beneficial for him.  But because he cannot take and I think next step would be Medrol Dosepak to see if this help with the pain inflammation.  Ultimately he may be somebody that benefit from an intra-articular injection.  Should this fail to alleviate his pain next step would be MRI for consideration of arthroscopic intervention. Could consider therapy but not sure will help significantly. No instability symptoms do not think brace would be helpful.     The patient has been ordered:  Pain Prescription    CONSULTS:   None    ACTIVE PROBLEM LIST  Patient Active Problem List   Diagnosis    Overweight (BMI 25.0-29.9)    Non-seasonal allergic rhinitis    -Annual physical exam    -Obesity (BMI 30-39.9)    -Chronic pain of both knees - follows           SUBJECTIVE    CHIEF COMPLAINT: Knee Pain    HPI:   Edwar Hung Jr. is a 26 y.o. male here for evaluation and management of bilateral knee pain. There is not a specific incident that brought about this pain. he has had progressive  "problems with the knee(s) starting 2 years ago but is now progressing to interfere with activities which include: walking, enjoying hobbies, getting in and out of a car and climbing stairs     Currently the pain in the joint is rated at mild with activity. The pain is intermittent and is located in the knee, at level of joint line and located medially. The pain is described as aching and throbbing. Relieving factors include rest and over the counter medication .     There is associated Catching, Clicking and Popping. Not painful. No instability    Edwar Hung Jr. has no additional complaints.     PROGRESSIVE SYMPTOMS:  Pain impacting work  Pain worsened by weight bearing    FUNCTIONAL STATUS:   Participate in recreational activities     PREVIOUS TREATMENTS:  Medical: Tylenol  Physical Therapy: Activities Modified   Previous Orthopaedic Surgery: None    REVIEW OF SYSTEMS:  PAIN ASSESSMENT:  See HPI.  MUSCULOSKELETAL: See HPI.  OTHER 10 point review of systems is negative except as stated in HPI above    PAST MEDICAL HISTORY   has no past medical history on file.     PAST SURGICAL HISTORY   has no past surgical history on file.     FAMILY HISTORY  family history includes Heart disease in his father.     SOCIAL HISTORY   reports that he has never smoked. He has never used smokeless tobacco. He reports that he does not drink alcohol and does not use drugs.     ALLERGIES   Review of patient's allergies indicates:   Allergen Reactions    Nsaids (non-steroidal anti-inflammatory drug)         MEDICATIONS  No current outpatient medications on file prior to visit.     No current facility-administered medications on file prior to visit.          PHYSICAL EXAM   height is 5' 10" (1.778 m) and weight is 95.3 kg (210 lb). His blood pressure is 125/86 and his pulse is 70. His respiration is 15 and oxygen saturation is 99%.   Body mass index is 30.13 kg/m².      All other systems deferred.  GENERAL:  No acute " distress  HABITUS: Normal  GAIT: Non-antalgic  SKIN: Normal     KNEE EXAM:    bilateral:   Effusion: No joint effusion  TTP: yes over Medial Joint Line   no crepitus with passive knee ROM  Passive ROM: Extension 0, Flexion 130  No pain with manipulation of patella  Stable to varus/valgus stress. No increased laxity to anterior/posterior drawer testing  negative Nikko's test  No pain with IR/ER rotation of the hip  5/5 strength in knee flexion and extension, ankle plantarflexion and dorsiflexion  Neurovascular Status: Sensation intact to light touch in Sural, Saphenous, SPN, DPN, Tibial nerve distribution  2+ pulse DP/PT, normal capillary refill, foot has normal warmth    DATA:  Diagnostic tests reviewed for today's visit:     The obtained knee radiographs appears normal for age. There is good alignment with no evidence of fracture, bony abnormalities or signficant degenerative changes.

## 2022-08-15 PROBLEM — Z00.00 ANNUAL PHYSICAL EXAM: Status: RESOLVED | Noted: 2022-05-10 | Resolved: 2022-08-15

## 2025-03-07 ENCOUNTER — PATIENT OUTREACH (OUTPATIENT)
Dept: ADMINISTRATIVE | Facility: HOSPITAL | Age: 30
End: 2025-03-07